# Patient Record
Sex: MALE | Race: WHITE | NOT HISPANIC OR LATINO | URBAN - NONMETROPOLITAN AREA
[De-identification: names, ages, dates, MRNs, and addresses within clinical notes are randomized per-mention and may not be internally consistent; named-entity substitution may affect disease eponyms.]

---

## 2023-02-28 PROBLEM — Q67.6 PECTUS EXCAVATUM: Status: ACTIVE | Noted: 2023-02-28

## 2023-02-28 PROBLEM — R10.11 RUQ PAIN: Status: ACTIVE | Noted: 2023-02-28

## 2023-02-28 PROBLEM — R10.13 ABDOMINAL PAIN, EPIGASTRIC: Status: ACTIVE | Noted: 2023-02-28

## 2023-02-28 PROBLEM — R29.91 MARFANOID HABITUS: Status: ACTIVE | Noted: 2023-02-28

## 2023-02-28 PROBLEM — H61.22 IMPACTED CERUMEN OF LEFT EAR: Status: ACTIVE | Noted: 2023-02-28

## 2023-02-28 PROBLEM — M54.6 THORACIC BACK PAIN: Status: ACTIVE | Noted: 2023-02-28

## 2023-02-28 PROBLEM — M79.18 MUSCULOSKELETAL PAIN: Status: ACTIVE | Noted: 2023-02-28

## 2023-02-28 PROBLEM — F41.9 ANXIETY: Status: ACTIVE | Noted: 2023-02-28

## 2023-02-28 PROBLEM — K29.00 ACUTE GASTRITIS: Status: ACTIVE | Noted: 2023-02-28

## 2023-02-28 PROBLEM — Z98.890 HISTORY OF NISSEN FUNDOPLICATION: Status: ACTIVE | Noted: 2023-02-28

## 2023-02-28 PROBLEM — R07.9 CHEST PAIN: Status: ACTIVE | Noted: 2023-02-28

## 2023-02-28 PROBLEM — F32.A DEPRESSION: Status: ACTIVE | Noted: 2023-02-28

## 2023-02-28 RX ORDER — PANTOPRAZOLE SODIUM 40 MG/1
40 TABLET, DELAYED RELEASE ORAL DAILY
COMMUNITY
Start: 2019-11-15 | End: 2023-03-30 | Stop reason: SDUPTHER

## 2023-02-28 RX ORDER — SUCRALFATE 1 G/1
1 TABLET ORAL 3 TIMES DAILY
COMMUNITY
Start: 2019-11-15 | End: 2023-03-30 | Stop reason: SDUPTHER

## 2023-03-30 ENCOUNTER — OFFICE VISIT (OUTPATIENT)
Dept: PRIMARY CARE | Facility: CLINIC | Age: 48
End: 2023-03-30
Payer: COMMERCIAL

## 2023-03-30 VITALS
WEIGHT: 126.8 LBS | SYSTOLIC BLOOD PRESSURE: 90 MMHG | OXYGEN SATURATION: 99 % | HEART RATE: 65 BPM | DIASTOLIC BLOOD PRESSURE: 60 MMHG | BODY MASS INDEX: 19.28 KG/M2

## 2023-03-30 DIAGNOSIS — Z12.11 COLON CANCER SCREENING: ICD-10-CM

## 2023-03-30 DIAGNOSIS — Q67.6 PECTUS EXCAVATUM: ICD-10-CM

## 2023-03-30 DIAGNOSIS — Z98.890 HISTORY OF NISSEN FUNDOPLICATION: ICD-10-CM

## 2023-03-30 DIAGNOSIS — R10.13 ABDOMINAL PAIN, EPIGASTRIC: ICD-10-CM

## 2023-03-30 DIAGNOSIS — Z00.00 ROUTINE GENERAL MEDICAL EXAMINATION AT A HEALTH CARE FACILITY: Primary | ICD-10-CM

## 2023-03-30 PROBLEM — R10.11 RUQ PAIN: Status: RESOLVED | Noted: 2023-02-28 | Resolved: 2023-03-30

## 2023-03-30 PROBLEM — R29.91 MARFANOID HABITUS: Status: RESOLVED | Noted: 2023-02-28 | Resolved: 2023-03-30

## 2023-03-30 PROBLEM — F41.9 ANXIETY: Status: RESOLVED | Noted: 2023-02-28 | Resolved: 2023-03-30

## 2023-03-30 PROBLEM — H61.22 IMPACTED CERUMEN OF LEFT EAR: Status: RESOLVED | Noted: 2023-02-28 | Resolved: 2023-03-30

## 2023-03-30 PROBLEM — M79.18 MUSCULOSKELETAL PAIN: Status: RESOLVED | Noted: 2023-02-28 | Resolved: 2023-03-30

## 2023-03-30 PROBLEM — M54.6 THORACIC BACK PAIN: Status: RESOLVED | Noted: 2023-02-28 | Resolved: 2023-03-30

## 2023-03-30 PROBLEM — K29.00 ACUTE GASTRITIS: Status: RESOLVED | Noted: 2023-02-28 | Resolved: 2023-03-30

## 2023-03-30 PROBLEM — F32.A DEPRESSION: Status: RESOLVED | Noted: 2023-02-28 | Resolved: 2023-03-30

## 2023-03-30 PROBLEM — R07.9 CHEST PAIN: Status: RESOLVED | Noted: 2023-02-28 | Resolved: 2023-03-30

## 2023-03-30 PROCEDURE — 1036F TOBACCO NON-USER: CPT | Performed by: FAMILY MEDICINE

## 2023-03-30 PROCEDURE — 99396 PREV VISIT EST AGE 40-64: CPT | Performed by: FAMILY MEDICINE

## 2023-03-30 RX ORDER — PANTOPRAZOLE SODIUM 40 MG/1
40 TABLET, DELAYED RELEASE ORAL DAILY
Qty: 90 TABLET | Refills: 3 | Status: SHIPPED | OUTPATIENT
Start: 2023-03-30 | End: 2024-05-30

## 2023-03-30 RX ORDER — SUCRALFATE 1 G/1
1 TABLET ORAL 3 TIMES DAILY
Qty: 270 TABLET | Refills: 3 | Status: SHIPPED | OUTPATIENT
Start: 2023-03-30

## 2023-03-30 ASSESSMENT — PATIENT HEALTH QUESTIONNAIRE - PHQ9
2. FEELING DOWN, DEPRESSED OR HOPELESS: NOT AT ALL
SUM OF ALL RESPONSES TO PHQ9 QUESTIONS 1 AND 2: 0
1. LITTLE INTEREST OR PLEASURE IN DOING THINGS: NOT AT ALL

## 2023-03-30 ASSESSMENT — ENCOUNTER SYMPTOMS
HEMATURIA: 0
NERVOUS/ANXIOUS: 0
TROUBLE SWALLOWING: 0
HEADACHES: 0
JOINT SWELLING: 0
FEVER: 0
SHORTNESS OF BREATH: 0
CONFUSION: 0
PALPITATIONS: 0
ARTHRALGIAS: 0
SEIZURES: 0
VOMITING: 0
UNEXPECTED WEIGHT CHANGE: 0
NAUSEA: 0
BELCHING: 0
ANOREXIA: 0
DYSURIA: 0
BLOOD IN STOOL: 0
CONSTIPATION: 0
FREQUENCY: 0
COLOR CHANGE: 0
APPETITE CHANGE: 0
HEMATOCHEZIA: 0
ABDOMINAL PAIN: 1
MYALGIAS: 0
WEIGHT LOSS: 0
DIFFICULTY URINATING: 0
DEPRESSION: 0
DIARRHEA: 0
FLATUS: 0

## 2023-03-30 NOTE — PROGRESS NOTES
Subjective   Patient ID: Tan Rivera is a 47 y.o. male who presents for Abdominal Pain (Sx x 4-5 years).  Abdominal Pain  This is a chronic problem. The current episode started more than 1 year ago. The onset quality is undetermined. The problem occurs daily. The problem has been waxing and waning. The pain is located in the periumbilical region. The pain is at a severity of 2/10. The quality of the pain is cramping. The abdominal pain radiates to the back. Pertinent negatives include no anorexia, arthralgias, belching, constipation, diarrhea, dysuria, fever, flatus, frequency, headaches, hematochezia, hematuria, melena, myalgias, nausea, vomiting or weight loss. The pain is aggravated by certain positions. The pain is relieved by Nothing.     Pleasant 47 year old  male presents for wellness check and medication renewal. Patient states that he has a dull abdominal pain that is worse when he lays on his right side or with bending over. Patient states that he has had this same pain for the last three years, it is not getting worse. Patient denies N/V, diarrhea, constipation, CP, SOB, numbness or tingling in extremities.    Review of Systems   Constitutional:  Negative for appetite change, fever, unexpected weight change and weight loss.   HENT:  Negative for congestion and trouble swallowing.    Eyes:  Negative for visual disturbance.   Respiratory:  Negative for shortness of breath.    Cardiovascular:  Negative for chest pain, palpitations and leg swelling.   Gastrointestinal:  Positive for abdominal pain. Negative for anorexia, blood in stool, constipation, diarrhea, flatus, hematochezia, melena, nausea and vomiting.   Genitourinary:  Negative for difficulty urinating, dysuria, frequency and hematuria.   Musculoskeletal:  Negative for arthralgias, gait problem, joint swelling and myalgias.   Skin:  Negative for color change.   Allergic/Immunologic: Negative for immunocompromised state.   Neurological:   Negative for seizures, syncope and headaches.   Psychiatric/Behavioral:  Negative for confusion and suicidal ideas. The patient is not nervous/anxious.        Objective   BP 90/60 (BP Location: Right arm, Patient Position: Sitting, BP Cuff Size: Adult)   Pulse 65   Wt 57.5 kg (126 lb 12.8 oz)   SpO2 99%   BMI 19.28 kg/m²     Physical Exam  Constitutional:       General: He is not in acute distress.     Appearance: Normal appearance. He is not ill-appearing.   HENT:      Head: Normocephalic and atraumatic.      Right Ear: Tympanic membrane normal.      Left Ear: Tympanic membrane normal.      Nose: Nose normal.      Mouth/Throat:      Mouth: Mucous membranes are moist.   Eyes:      Pupils: Pupils are equal, round, and reactive to light.   Cardiovascular:      Rate and Rhythm: Normal rate and regular rhythm.      Heart sounds: No murmur heard.     No friction rub. No gallop.   Pulmonary:      Effort: Pulmonary effort is normal.      Breath sounds: Normal breath sounds.   Abdominal:      General: Abdomen is flat. There is no distension.      Palpations: Abdomen is soft.      Tenderness: There is no abdominal tenderness. There is no guarding.      Hernia: No hernia is present.   Musculoskeletal:         General: Normal range of motion.   Skin:     General: Skin is warm and dry.   Neurological:      General: No focal deficit present.      Mental Status: He is alert. Mental status is at baseline.      Cranial Nerves: No cranial nerve deficit.      Motor: No weakness.      Gait: Gait normal.   Psychiatric:         Mood and Affect: Mood normal.         Behavior: Behavior normal.         Thought Content: Thought content normal.         Judgment: Judgment normal.         Assessment/Plan   Problem List Items Addressed This Visit          Nervous    Abdominal pain, epigastric    Relevant Medications    pantoprazole (ProtoNix) 40 mg EC tablet    sucralfate (Carafate) 1 gram tablet       Musculoskeletal    Pectus excavatum        Other    History of Nissen fundoplication    Relevant Medications    pantoprazole (ProtoNix) 40 mg EC tablet    sucralfate (Carafate) 1 gram tablet     Other Visit Diagnoses       Routine general medical examination at a health care facility    -  Primary    Colon cancer screening        Relevant Orders    Cologuard® colon cancer screening           Chronic abd pain: hx of Nissen fundoplication  -continue protonix, doesn't seem to need carafate  -more positional- offered to refer back to GI for updated EGD wants to wait    Pectus Excavatum:  -no symptoms    .Health Maintenance Reminder:  -Blood Work: next year  -PSA: next year  -Hep C: neat year  -Colonoscopy: cologuard  -Shingrix: 50y  -Prevnar 20: 65y  -Flu:  Yearly

## 2023-05-29 LAB — NONINV COLON CA DNA+OCC BLD SCRN STL QL: NEGATIVE

## 2023-08-09 ENCOUNTER — APPOINTMENT (OUTPATIENT)
Dept: PRIMARY CARE | Facility: CLINIC | Age: 48
End: 2023-08-09
Payer: COMMERCIAL

## 2023-08-09 RX ORDER — ASPIRIN 81 MG/1
81 TABLET ORAL DAILY
COMMUNITY

## 2024-05-30 DIAGNOSIS — R10.13 ABDOMINAL PAIN, EPIGASTRIC: ICD-10-CM

## 2024-05-30 DIAGNOSIS — Z98.890 HISTORY OF NISSEN FUNDOPLICATION: ICD-10-CM

## 2024-05-30 RX ORDER — PANTOPRAZOLE SODIUM 40 MG/1
40 TABLET, DELAYED RELEASE ORAL DAILY
Qty: 90 TABLET | Refills: 0 | Status: SHIPPED | OUTPATIENT
Start: 2024-05-30

## 2025-04-18 ENCOUNTER — APPOINTMENT (OUTPATIENT)
Dept: PRIMARY CARE | Facility: CLINIC | Age: 50
End: 2025-04-18

## 2025-04-18 VITALS
BODY MASS INDEX: 18.29 KG/M2 | OXYGEN SATURATION: 99 % | HEART RATE: 89 BPM | WEIGHT: 123.5 LBS | HEIGHT: 69 IN | DIASTOLIC BLOOD PRESSURE: 62 MMHG | SYSTOLIC BLOOD PRESSURE: 100 MMHG

## 2025-04-18 DIAGNOSIS — R10.13 ABDOMINAL PAIN, EPIGASTRIC: ICD-10-CM

## 2025-04-18 DIAGNOSIS — Z13.0 SCREENING FOR DEFICIENCY ANEMIA: ICD-10-CM

## 2025-04-18 DIAGNOSIS — L98.9 SKIN LESION: ICD-10-CM

## 2025-04-18 DIAGNOSIS — Z13.1 SCREENING FOR DIABETES MELLITUS: ICD-10-CM

## 2025-04-18 DIAGNOSIS — Z98.890 HISTORY OF NISSEN FUNDOPLICATION: ICD-10-CM

## 2025-04-18 DIAGNOSIS — Z12.5 SCREENING FOR MALIGNANT NEOPLASM OF PROSTATE: Primary | ICD-10-CM

## 2025-04-18 DIAGNOSIS — Z13.220 SCREENING FOR HYPERLIPIDEMIA: ICD-10-CM

## 2025-04-18 RX ORDER — PANTOPRAZOLE SODIUM 40 MG/1
40 TABLET, DELAYED RELEASE ORAL DAILY
Qty: 90 TABLET | Refills: 3 | Status: SHIPPED | OUTPATIENT
Start: 2025-04-18

## 2025-04-18 RX ORDER — SUCRALFATE 1 G/1
1 TABLET ORAL 3 TIMES DAILY
Qty: 270 TABLET | Refills: 3 | Status: SHIPPED | OUTPATIENT
Start: 2025-04-18

## 2025-04-18 ASSESSMENT — ENCOUNTER SYMPTOMS
DIARRHEA: 0
FLATUS: 0
ABDOMINAL PAIN: 1
ANOREXIA: 0
HEADACHES: 0
VOMITING: 0
HEMATURIA: 0
NAUSEA: 0
FEVER: 0
FREQUENCY: 0
HEMATOCHEZIA: 0
MYALGIAS: 0
ARTHRALGIAS: 0
DYSURIA: 0
WEIGHT LOSS: 0
CONSTIPATION: 0
BELCHING: 0

## 2025-04-18 NOTE — PROGRESS NOTES
"Subjective   Patient ID: Tan Rivera is a 49 y.o. male who presents for Annual Exam (Yearly physical ).  Abdominal Pain  This is a chronic problem. The current episode started more than 1 year ago. The onset quality is sudden. The problem occurs constantly. The most recent episode lasted 24 hours. The problem has been waxing and waning. The pain is located in the LUQ. The pain is at a severity of 2/10. The quality of the pain is cramping. The abdominal pain radiates to the back. Pertinent negatives include no anorexia, arthralgias, belching, constipation, diarrhea, dysuria, fever, flatus, frequency, headaches, hematochezia, hematuria, melena, myalgias, nausea, vomiting or weight loss. The pain is aggravated by certain positions. The pain is relieved by Nothing.       Chronic Issues:  - GERD- on protonix 40 mg daily and wants carafate back     New concerns:  - new lesion on L arm, scaly and sometimes itchy, has come up in only the last month or so     Health Maintenance Reminder:  -Blood Work: today  -PSA: today  -Hep C: today  -Colonoscopy: cologuard neg 2023, due 2026  -Shingrix: 50y refused  -Prevnar 20: 65y  -Flu:  refused    Mood: no depression or anxiety   Sleep: fine, sometimes cant sleep through night , wakes up 4x, can fall right back to sleep.   Appetite/diet: appetite and diet is fine   Exercise: \"not as much as he should\"  Drugs, alcohol, tobacco: denies all     Current Medications[1]   Surgical History[2]   Medical History[3]  Social History[4]   Family History[5]   Review of Systems   Constitutional:  Negative for fever and weight loss.   Gastrointestinal:  Positive for abdominal pain. Negative for anorexia, constipation, diarrhea, flatus, hematochezia, melena, nausea and vomiting.   Genitourinary:  Negative for dysuria, frequency and hematuria.   Musculoskeletal:  Negative for arthralgias and myalgias.   Neurological:  Negative for headaches.     10 point ROS negative except as otherwise noted in the " "HPI.      Objective   /62   Pulse 89   Ht 1.753 m (5' 9\")   Wt 56 kg (123 lb 8 oz)   SpO2 99%   BMI 18.24 kg/m²    Physical Exam  Constitutional:       Appearance: Normal appearance.   HENT:      Head: Normocephalic and atraumatic.   Eyes:      Extraocular Movements: Extraocular movements intact.      Pupils: Pupils are equal, round, and reactive to light.   Cardiovascular:      Rate and Rhythm: Normal rate and regular rhythm.      Pulses: Normal pulses.      Heart sounds: Normal heart sounds.   Pulmonary:      Effort: Pulmonary effort is normal.      Breath sounds: Normal breath sounds.   Musculoskeletal:         General: Normal range of motion.      Right lower leg: No edema.      Left lower leg: No edema.   Skin:     General: Skin is warm and dry.      Findings: Lesion present. No rash.      Comments: 5 mm flesh colored raised and scaled lesion on L forearm   Neurological:      General: No focal deficit present.      Mental Status: He is alert and oriented to person, place, and time.      Comments: Antalgic gait   Psychiatric:         Mood and Affect: Mood normal.         Behavior: Behavior normal.               Shaving Epidermal/Dermal    Date/Time: 4/18/2025 9:04 AM    Performed by: Nette Cao PA-C  Authorized by: Nette Cao PA-C    Number of Lesions: 1  Lesion 1:     Body area: upper extremity    Upper extremity location: L lower arm    Initial size (mm): 5    Final defect size (mm): 7    Malignancy: malignancy unknown      Destruction method: curettage      Comments:  5 mm round raised scaly flesh colored lesion , slightly darker than surrounding tissue  Risks, benefits, alternatives to biopsy discussed. Pt decided to proceed w biopsy today   Cleaned with povidine iodine and alcohol swabs   Injected with 2 cc lido w epi   Shave bx completed   electrocautery after  Bandaid applied  Tolerated well, no immediate complications  Lesion sent for analysis       Assessment/Plan   Problem List " Items Addressed This Visit       Abdominal pain, epigastric  -GERD grossly stable. Protonix and carafate help but do not resolve  - continued to recommend referral back to GI for repeat EGD but pt feels things are steady and states he will do this if it gets worse.    Relevant Medications    sucralfate (Carafate) 1 gram tablet    pantoprazole (ProtoNix) 40 mg EC tablet    History of Nissen fundoplication    Relevant Medications    sucralfate (Carafate) 1 gram tablet    pantoprazole (ProtoNix) 40 mg EC tablet     Other Visit Diagnoses         Screening for malignant neoplasm of prostate    -  Primary    Relevant Orders    Prostate Specific Antigen      Screening for hyperlipidemia        Relevant Orders    Lipid Panel      Screening for diabetes mellitus        Relevant Orders    Comprehensive Metabolic Panel      Screening for deficiency anemia        Relevant Orders    CBC and Auto Differential      Skin lesion      - new suspicious lesion on L forearm  - shave bx as per procedure note     Relevant Orders    Surgical Pathology Exam     Labs today   Will call w lab and bx results        Discussed at visit any disease processes that were of concern as well as the risks, benefits and instructions on any new medication provided. Patient (and/or caretaker of patient if present) stated all questions were answered, and they voiced understanding of instructions.     Nette Cao PA-C          [1]   Current Outpatient Medications:     pantoprazole (ProtoNix) 40 mg EC tablet, Take 1 tablet (40 mg) by mouth once daily., Disp: 90 tablet, Rfl: 3    sucralfate (Carafate) 1 gram tablet, Take 1 tablet (1 g) by mouth 3 times a day., Disp: 270 tablet, Rfl: 3  [2]   Past Surgical History:  Procedure Laterality Date    CERVICAL FUSION  04/17/2015    Cervical Vertebral Fusion    GASTRIC FUNDOPLICATION  07/24/2017    Esophagogastric Fundoplasty Nissen Fundoplication    OTHER SURGICAL HISTORY  07/24/2017    Repair Of Paraesophageal  Hiatus Hernia   [3]   Past Medical History:  Diagnosis Date    Anxiety disorder, unspecified     Anxiety    Chronic vascular disorders of intestine 04/03/2017    Superior mesenteric artery syndrome    Diaphragmatic hernia without obstruction or gangrene 01/22/2018    Hiatal hernia with gastroesophageal reflux    Personal history of other diseases of the digestive system 01/22/2018    History of gastroesophageal reflux (GERD)   [4]   Social History  Tobacco Use    Smoking status: Never    Smokeless tobacco: Never   Substance Use Topics    Alcohol use: Not Currently    Drug use: Never   [5]   Family History  Problem Relation Name Age of Onset    Other (cancer of abdominal organ) Mother

## 2025-04-19 LAB
ALBUMIN SERPL-MCNC: 4.5 G/DL (ref 3.6–5.1)
ALP SERPL-CCNC: 50 U/L (ref 35–144)
ALT SERPL-CCNC: 12 U/L (ref 9–46)
ANION GAP SERPL CALCULATED.4IONS-SCNC: 5 MMOL/L (CALC) (ref 7–17)
AST SERPL-CCNC: 17 U/L (ref 10–35)
BASOPHILS # BLD AUTO: 40 CELLS/UL (ref 0–200)
BASOPHILS NFR BLD AUTO: 1 %
BILIRUB SERPL-MCNC: 0.6 MG/DL (ref 0.2–1.2)
BUN SERPL-MCNC: 17 MG/DL (ref 7–25)
CALCIUM SERPL-MCNC: 9.2 MG/DL (ref 8.6–10.3)
CHLORIDE SERPL-SCNC: 103 MMOL/L (ref 98–110)
CHOLEST SERPL-MCNC: 168 MG/DL
CHOLEST/HDLC SERPL: 2.9 (CALC)
CO2 SERPL-SCNC: 32 MMOL/L (ref 20–32)
CREAT SERPL-MCNC: 0.93 MG/DL (ref 0.7–1.3)
EGFRCR SERPLBLD CKD-EPI 2021: 99 ML/MIN/1.73M2
EOSINOPHIL # BLD AUTO: 60 CELLS/UL (ref 15–500)
EOSINOPHIL NFR BLD AUTO: 1.5 %
ERYTHROCYTE [DISTWIDTH] IN BLOOD BY AUTOMATED COUNT: 12.3 % (ref 11–15)
GLUCOSE SERPL-MCNC: 80 MG/DL (ref 65–99)
HCT VFR BLD AUTO: 43.4 % (ref 38.5–50)
HDLC SERPL-MCNC: 58 MG/DL
HGB BLD-MCNC: 14.4 G/DL (ref 13.2–17.1)
LDLC SERPL CALC-MCNC: 90 MG/DL (CALC)
LYMPHOCYTES # BLD AUTO: 1340 CELLS/UL (ref 850–3900)
LYMPHOCYTES NFR BLD AUTO: 33.5 %
MCH RBC QN AUTO: 31.4 PG (ref 27–33)
MCHC RBC AUTO-ENTMCNC: 33.2 G/DL (ref 32–36)
MCV RBC AUTO: 94.8 FL (ref 80–100)
MONOCYTES # BLD AUTO: 420 CELLS/UL (ref 200–950)
MONOCYTES NFR BLD AUTO: 10.5 %
NEUTROPHILS # BLD AUTO: 2140 CELLS/UL (ref 1500–7800)
NEUTROPHILS NFR BLD AUTO: 53.5 %
NONHDLC SERPL-MCNC: 110 MG/DL (CALC)
PLATELET # BLD AUTO: 229 THOUSAND/UL (ref 140–400)
PMV BLD REES-ECKER: 9.1 FL (ref 7.5–12.5)
POTASSIUM SERPL-SCNC: 4.2 MMOL/L (ref 3.5–5.3)
PROT SERPL-MCNC: 6.7 G/DL (ref 6.1–8.1)
PSA SERPL-MCNC: 0.56 NG/ML
RBC # BLD AUTO: 4.58 MILLION/UL (ref 4.2–5.8)
SODIUM SERPL-SCNC: 140 MMOL/L (ref 135–146)
TRIGL SERPL-MCNC: 106 MG/DL
WBC # BLD AUTO: 4 THOUSAND/UL (ref 3.8–10.8)

## 2025-04-29 LAB
LABORATORY COMMENT REPORT: NORMAL
PATH REPORT.FINAL DX SPEC: NORMAL
PATH REPORT.GROSS SPEC: NORMAL
PATH REPORT.RELEVANT HX SPEC: NORMAL
PATH REPORT.TOTAL CANCER: NORMAL

## 2025-07-02 ENCOUNTER — OFFICE VISIT (OUTPATIENT)
Dept: SURGERY | Facility: CLINIC | Age: 50
End: 2025-07-02
Payer: COMMERCIAL

## 2025-07-02 DIAGNOSIS — R10.13 ABDOMINAL PAIN, EPIGASTRIC: ICD-10-CM

## 2025-07-02 DIAGNOSIS — K44.9 HIATAL HERNIA: Primary | ICD-10-CM

## 2025-07-02 PROCEDURE — 99203 OFFICE O/P NEW LOW 30 MIN: CPT | Performed by: SURGERY

## 2025-07-02 NOTE — PROGRESS NOTES
General Surgery History and Physical    Referring Provider:  Nette Cao PA-C    Chief Complaint:  Hernia pain    History of Present Illness:  This is a 49 y.o. male who presents with epigastric pressure and discomfort.  He reports that he had issues with this for many years, but in 2018 had a laparoscopic hiatal hernia repair with Nissen fundoplication.  He reports that those symptoms drastically reduced to almost nothing after surgery.  However, he has remained on his antacids.  He reports that he has done well until about the last 3 weeks.  He reports that he is now having near constant pressure in his epigastrium subxiphoid region.  He denies any heartburn or reflux.  He denies any dysphagia to solids or liquids.  However, he reports that the pressures usually worse if he bends over.  He denies any alleviating factors.    Past Medical History:  Anxiety  Gastroesphageal reflux disease    Past Surgical History:  Cervical fusion  Laparoscopic Nissen Fundoplication    Medications:  Pantoprazole  Carafate    Allergies:  No known drug allergies    Family History:  Unknown cancer    Social History:  Single.  Works as an .  Denies tobacco, alcohol, and illicits.    Review of Systems:  A complete 12 point review of systems was performed and is negative except as noted in the history of present illness.      Vital Signs:  There were no vitals filed for this visit.       Physical Exam:  General: No acute distress. Sitting up in bed.   Neuro: Alert and oriented ×3. Follows commands.  Head: Atraumatic  Eyes: Pupils equal reactive to light. Extraocular motions intact.  Ears: Hears normal speaking voice.  Mouth, Nose, Throat: Mucous membranes moist.  Normal dentition.  Neck: Supple. No appreciable masses.  Chest: No crepitus.  No appreciable scars.  Heart: Regular rate and rhythm.  Lung: Clear to auscultation bilaterally.  Vascular: No carotid bruits.  Palpable radial pulses bilaterally.  Abdomen: Soft.  Nondistended. Nontender.  Well-healed laparoscopic scars.  Musculoskeletal: Moves all extremities.  Normal range of motion.  Lymphatic: No palpable lymph nodes.  Skin: No rashes or lesions.  Psychological: Normal affect      Laboratory Values:  CBC:   Lab Results   Component Value Date    WBC 4.0 04/18/2025    RBC 4.58 04/18/2025    HGB 14.4 04/18/2025    HCT 43.4 04/18/2025     04/18/2025       RFP:   Lab Results   Component Value Date     04/18/2025    K 4.2 04/18/2025     04/18/2025    CO2 32 04/18/2025    BUN 17 04/18/2025    CREATININE 0.93 04/18/2025    CALCIUM 9.2 04/18/2025        LFTs:   Lab Results   Component Value Date    PROT 6.7 04/18/2025    ALBUMIN 4.5 04/18/2025    BILITOT 0.6 04/18/2025    ALKPHOS 50 04/18/2025    AST 17 04/18/2025    ALT 12 04/18/2025            Imaging:   None      Assessment:  This is a 49 y.o. male who presents with epigastric discomfort for about 3 weeks.  He has previously undergone a laparoscopic hiatal hernia repair with Nissen fundoplication.  He specifically denies any issues with heartburn or reflux as well as dysphagia to either solids or liquids.  However, he is concerned for a recurrent hiatal hernia.  We discussed that this is a reasonable concern, but at this time it is uncertain if this is actually going on.  We discussed starting with an esophagram, and following up with Dr. Rowley.      Plan:  -- Esophagram  -- Referral to Dr. Halley London MD  General Surgery  Office: [phone]  Fax:     [phone]  10:39 AM   07/02/25

## 2025-07-14 ENCOUNTER — HOSPITAL ENCOUNTER (OUTPATIENT)
Dept: RADIOLOGY | Facility: HOSPITAL | Age: 50
Discharge: HOME | End: 2025-07-14
Payer: COMMERCIAL

## 2025-07-14 DIAGNOSIS — R10.13 ABDOMINAL PAIN, EPIGASTRIC: ICD-10-CM

## 2025-07-14 DIAGNOSIS — K44.9 HIATAL HERNIA: ICD-10-CM

## 2025-07-14 PROCEDURE — A9698 NON-RAD CONTRAST MATERIALNOC: HCPCS | Performed by: SURGERY

## 2025-07-14 PROCEDURE — 2500000001 HC RX 250 WO HCPCS SELF ADMINISTERED DRUGS (ALT 637 FOR MEDICARE OP): Performed by: SURGERY

## 2025-07-14 PROCEDURE — 74221 X-RAY XM ESOPHAGUS 2CNTRST: CPT

## 2025-07-14 PROCEDURE — 2500000005 HC RX 250 GENERAL PHARMACY W/O HCPCS: Performed by: SURGERY

## 2025-07-14 PROCEDURE — 74221 X-RAY XM ESOPHAGUS 2CNTRST: CPT | Performed by: RADIOLOGY

## 2025-07-14 RX ADMIN — BARIUM SULFATE 100 ML: 980 POWDER, FOR SUSPENSION ORAL at 10:25

## 2025-07-14 RX ADMIN — ANTACID/ANTIFLATULENT 1 PACKET: 380; 550; 10; 10 GRANULE, EFFERVESCENT ORAL at 10:25

## 2025-07-14 RX ADMIN — BARIUM SULFATE 70 ML: 0.6 SUSPENSION ORAL at 10:25

## 2025-07-24 ENCOUNTER — TELEPHONE (OUTPATIENT)
Dept: SURGERY | Facility: CLINIC | Age: 50
End: 2025-07-24
Payer: COMMERCIAL

## 2025-07-24 NOTE — PROGRESS NOTES
"GENERAL SURGERY REFLUX SURGERY NEW PATIENT CONSULTATION  HISTORY AND PHYSICAL    CLINIC DATE:  7/28/25    NAME: Tan Rivera  49 y.o.  MRN: [id number]    WEIGHT / BMI TODAY:    Wt Readings from Last 1 Encounters:   04/18/25 56 kg (123 lb 8 oz)      BMI Readings from Last 1 Encounters:   04/18/25 18.24 kg/m²       SURGEON:  Dr. Zita Rowley    PRESENTING TODAY for General Surgery initial consult visit.  This LPN spoke with a 49 y.o. male on 7/24/25.  Accepted earlier NPV date when offered.          CURRENT SYMPTOMS:     Abdominal Pain: Yes          Abdominal Bloating: Yes         Burping: No          Chest Pain: Yes      Chronic Cough: No         Constipation: No    Diarrhea: No    Dysphagia: Yes   \"a little bit\"    Hunger: No    Food Intolerances: Yes \"no spicy\"    Food Sticking: No    Gassy: No    Hiccups: No          Hoarseness: Yes       Hx Gastric Ulcers: No         Nausea: No    Symptoms Affect Ability to Excercise: Yes     \"if I do like heavy lifting then it aggravates me\"    Throat Clearing: No         Vomiting: No    Symptom Onset:  \"it's been years, worse a little while ago, now not as bad for about 1.5 months but still there\"      EATING HABITS HISTORY:      Carbonated Beverages: No          Caffeinated Beverages: Yes    Fluid intake: \"feels hydrated\": Yes       GERD - Health Related Quality of Life Questionnaire (GERD- HRQL)  On PPI  Pantoprazole 40 mg Daily & Sucralfate TID (Self stopped not sure should be taking)    How bad is the heartburn? 2 = Symptoms noticeable and bothersome but not every day  Heartburn when lying down? 2 = Symptoms noticeable and bothersome but not every day  Heartburn when standing up? 2 = Symptoms noticeable and bothersome but not every day  Heartburn after meals? 2 = Symptoms noticeable and bothersome but not every day  Does heartburn change your diet? 3 = Symptoms bothersome every day  \"avoids spicy & alcohol\"  Does heartburn wake you from sleep? 0 = No symptoms    Do you have " difficulty swallowing? 0 = No symptoms  Do you have pain with swallowing? 0 = No symptoms  If you take medication, does this affect your daily life? 1 = Symptoms noticeable but not bothersome    How bad is the regurgitation? 0 = No symptoms  Regurgitation when lying down? 0 = No symptoms  Regurgitation when standing up? 0 = No symptoms  Regurgitation after meals? 0 = No symptoms  Does regurgitation change your diet? 0 = No symptoms  Does regurgitation wake you from sleep? 0 = No symptoms  How satisfied are you with your present condition? Dissatisfied    Total score (calculated by summing the individual scores of questions 1-15): 12   Greatest possible score 75 (worst symptoms).   Lowest possible score 0 (no symptoms).    Heartburn score (calculated by summing the individual scores of questions 1-6): 11   Worst heartburn symptoms: 30.   No heartburn symptoms: 0.   Score less than or equal to 12 with each individual question not exceeding 2 indicate heartburn elimination.    Regurgitation score (calculated by summing the individual scores of questions 10-15): 0   Worst regurgitation symptoms: 30.   No regurgitation symptoms: 0.   Score less than or equal to 12 with each individual question not exceeding 2 indicate regurgitation elimination.       OTHER PROVIDER TEST RESULTS:    FL esophagus double contrast study w chest   Status: Final result     PACS Images     Show images for FL esophagus double contrast study w chest   Signed by    Signed Time Phone Pager   Doug Taylor MD 7/14/2025 10:37     FINDINGS:      The oral phase of swallowing was well coordinated and strong.  There  was no penetration of the airway, nasopharyngeal reflux, or  aspiration. .  Initial wave of peristalsis did not completely clear the esophagus of  thick liquid barium with each swallow. There was mild delay in full  esophageal emptying with the thick barium liquid. There was more  normal appearing esophageal peristalsis with  water and thin liquid  barium. There was no esophageal stricture or ulcer or mass. There was  a persistent defect in the gastric fundus consistent with previous  Nissen fundoplication. There was very mild smooth tapering of the  distal esophagus through the Nissen fundoplication, consistent with  an intact and functioning wrap. There was no hiatal hernia or  gastroesophageal reflux during this exam. .  Other than the defect in the gastric fundus from the Nissen  fundoplication, the stomach distended normally and show grossly  normal fold pattern throughout. There was no gross intraluminal mass  or ulceration. There was no gastric outlet obstruction.      The duodenum and proximal jejunum showed normal course, caliber, and  fold pattern.      IMPRESSION:  Intact and functioning Nissen fundoplication.      Mild esophageal dysmotility evident with the thick liquid barium.  Please see above for details.      Remainder of the exam was negative.      MACRO:  None      Signed by: Doug Taylor 7/14/2025 10:37 AM      OTHER PROVIDER IMPRESSION NOTES:       Assessment:  This is a 49 y.o. male who presents with epigastric discomfort for about 3 weeks.  He has previously undergone a laparoscopic hiatal hernia repair with Nissen fundoplication.  He specifically denies any issues with heartburn or reflux as well as dysphagia to either solids or liquids.  However, he is concerned for a recurrent hiatal hernia.  We discussed that this is a reasonable concern, but at this time it is uncertain if this is actually going on.  We discussed starting with an esophagram, and following up with Dr. Rowley.        Plan:  -- Esophagram  -- Referral to Dr. Halley London MD  General Surgery  Office: [phone]  Fax:     [phone]  10:39 AM   07/02/25                Electronically signed by Harpal London MD at 7/2/2025 11:14 AM    PROVIDER LAST IMPRESSION NOTES:  Chief Complaint NOV: referred by Dr. Reina for SMA  History of Present Illness 42 y/o male presenting with 2 years h/o intermittent chest pain and back pain and 1 year of epigastric pain. he describes chest pain to be like pressure sensation. No relation with food intake. Dexilant seems to make it better. He reports no difficulty with swallowing food. Denies any nausea or vomiting. Back pain seems to be getting better. Last summer he went to ED with severe chest pain and epigastric pain. He underwent EKG which was normal per patient. They prescribed PPI which helped a little with pain. Currently epigastric pain is made worse by exertion and bending down. No relation with food intake. No improvement with dexilant. Patiernt reports no weight loss with these complain and reports that he has always been underweight. No h/o similar pain in past before 2 years ago. Reports normal bowel movements except occasional diarrhea. He noted he started losing weight after last summer. that is when the chest pain got worse and epigastric pain. Laying down makes it worse. Rolling around helps. So now does not lay down at all. When laying down for ct, had severe pain in epigastrium and almost had to get up in middle of procedure.. All symptoms behind the breast. He ran out of dexilant a week ago. Notes the chest pains are coming back. worse the last 2 days. Zantac does not help much. No trouble eating. notes appetite decreased. notes early satiety. most he has ever weighed if 5 lbs above current weigght.     Results/Data CT Abdomen and Pelvis with Contrast 10Mar2017 01:07PM.   IMPRESSION: MORPHOLOGY OF THE DUODENUM, SMA AND ITS PRIMARY BRANCHES, LEFT RENAL VEIN, INFERIOR MESENTERIC VEIN AND ITS TRIBUTARIES DESCRIBED ABOVE IS SUGGESTIVE OF POSSIBILITY OF BOTH SMA SYNDROME (SYMPTOMATIC COMPRESSION OF THE MID DUODENUM BY AV SMA OR IN THIS CASE ITS 1ST ORDER BRANCHES) AND NUTCRACKER SYNDROME (LEFT RENAL VEIN COMPRESSION BY THE SMA WITH CONSEQUENT STASIS AND ENGORGEMENT IN THE IMV AND ITS  TRIBUTARIES), ALL DUE TO HYPER ACUTE ANGULATION OF THE SMA AND ITS 1ST ORDER BRANCHES RELATIVE TO THE AORTA BEHIND IT. NO OTHER CONTRIBUTORY ABNORMALITY. Transcribed by: [id number], User Electronically signed by: MARTHA 03/13/17 09:06    Provider Impressions 40 y/o male with 3 y/o h/o intermittent chest pain and back pain and 1 year h/o epigastric pain. Imaging shows SMA compressing 4th portion of duodenum. Patient is underweight and likely contributing to SMA syndrome. His symptoms do not go with the findings but all may be due to obstruction due to the RUSTY. Patient notes position changes bring on symptoms. We will do upper gi and egd for further eval. medically treat the spasms and then determine further managetment. Patient Discussion/Summary Will also do upper endoscopy and upper GI May need to do a manometry Start high calorie and high protein diet. Drink 3-4 protein shakes daily in addition to meals. eat 5-6 times/day. Check nutritional labs May need surgical bypass if continued symptoms Cont PPI for symptoms relief. We will renew the dexilant. We will start the amytriptilline for the spasms. Attending Note Attendee Role: Fellow Attestation: I saw and evaluated the patient. I personally obtained the key and critical portions of the history and physical exam or was physically present for key and critical portions performed by the attendee. I reviewed the attendee's documentation and discussed the patient with the attendee. I agree with the attendee's medical decision making as documented on the attendee's note.   Signatures Electronically signed by : Zita Rowley MD; Apr 3 2017 3:28PM EST (Author)  __  Chief Complaint S/P 1 1/2 weeks- HHR. History of Present Illness 40 yo male 2 weeks post Nissen and HH repair. Says he still feels some chest pressure. Notes it was worse with laying down and bending over. Eating soft foods. notes some early satiety. moving bowels    Surgical History ? History of Cervical  Vertebral Fusion ? History of Esophagogastric Fundoplasty Nissen Fundoplication ? History of Repair Of Paraesophageal Hiatus Hernia     Provider Impressions 42 yo male 2 weeks post nissen overall doing well. Most symptoms resolved. Just still some chest pressure. But he can bend over and lay flat which is better than before surgery. We discussed that the chest pressure is likely healing and hope it will resolve with time. Needed some percocet post op. No anti reflux meds since surgery. Reassured that he is healing. He notes his symptoms have gone from a 10 at worst to a 2 now (they were 5/6 on meds). Patient Discussion/Summary Eat more often with 6 meals. Chew and eat slowly. Increase the consitency of the food. Red meats and doughy breads will be the hardest to swallow. Don't try these until everything else going well. You can now do ground meats, cooked vegetables. Make sure you chew well. End of Encounter Meds DiazePAM 2 MG Oral Tablet; TAKE 1/2 TABLET TWICE DAILY; Therapy: [id number] to (Evaluate:[id number]); Last Rx:[id number]; Status: ACTIVE - Retrospective By Protocol Authorization Ordered Signatures     Electronically signed by : Zita Rowley MD; Jul 24 2017 11:17AM EST (Author)  __  Provider Impressions 43 yo male well known to me who had a Nissen and HH repair a year ago. He is doing well with this. But he notes some ongoing pressure in chest. He notes it is worse when does not move bowels. We will monitor for now and encouraged to eat more vegetables. No relief with antacids. Not worsened by eating. All other symptoms from before surgery have resolved. Patient Discussion/Summary Plan to have a fruit or vegetable at least 3 times/day Eat your bran muffin daily. Eat 3 meals/day. Keep the bowels regular   Electronically signed by : Zita Rowley MD; Apr 23 2018 9:05AM EST (Author)      CURRENT MEDICATIONS:    Current Medications[1]    PAST MEDICAL HISTORY:  Problem List[2]    PAST SURGICAL HISTORY:  Surgical  History[3]    FAMILY HISTORY:  Family History[4]    SOCIAL HISTORY:  Social History     Socioeconomic History    Marital status: Single     Spouse name: Not on file    Number of children: Not on file    Years of education: Not on file    Highest education level: Not on file   Occupational History    Not on file   Tobacco Use    Smoking status: Never    Smokeless tobacco: Never   Substance and Sexual Activity    Alcohol use: Not Currently    Drug use: Never    Sexual activity: Not on file   Other Topics Concern    Not on file   Social History Narrative    Not on file     Social Drivers of Health     Financial Resource Strain: Not on file   Food Insecurity: Not on file   Transportation Needs: Not on file   Physical Activity: Not on file   Stress: Not on file   Social Connections: Not on file   Intimate Partner Violence: Not on file   Housing Stability: Not on file       ALLERGIES:  RX Allergies[5]    REVIEW OF SYSTEMS:  GENERAL: Negative for malaise, significant weight loss and fever  NECK: Negative for lumps, goiter, pain and significant neck swelling  RESPIRATORY: Negative for cough, wheezing or shortness of breath.  CARDIOVASCULAR: Negative for chest pain, leg swelling or palpitations.  GI: Negative for abdominal discomfort, blood in stools or black stools or change in bowel habits  : No history of dysuria, frequency or incontinence  MUSCULOSKELETAL: Negative for joint pain or swelling, back pain or muscle pain.  SKIN: Negative for lesions, rash, and itching.  PSYCH: Negative for sleep disturbance, mood disorder and recent psychosocial stressors.  ENDOCRINE: Negative for cold or heat intolerance, polyuria, polydipsia and goiter.    PHYSICAL EXAM:  There were no vitals taken for this visit.  General appearance:   Skin: warm, no erythema or rashes  Lungs: clear to percussion and auscultation  Heart: regular rhythm and S1, S2 normal  Abdomen: soft, non-tender, no masses, no organomegaly  Extremities: Normal exam of  the extremities. No swelling or pain.  Neck: supple with no nodes    IMPRESSION:  Tan Rivera is a 49 y.o. male with ***  This patient is having ongoing symptoms despite PPI use and optimal medical management. We will work up with esophagram, UGI, EGD, pH study and manometry.  Then we can discuss the treatment options.  We briefly discussed surgical options with hiatal hernia repair and possible plication versus magnetic sphincter augmentation.  We also discussed endoscopic approaches with TIF and Stretta.  The patient will follow up after the studies are complete.    PLAN:  ***  UGI/Esophagram with views of the stomach-Schedule this in fluoroscopy in radiology at your convenience  EGD with pH probe-For this you will need a .  You will need to stop your omeprazole 7 days prior and switch to pepcid..  Stop the pepcie 4 days prior and then use tums until the day before.  When you have the probe in place, be sure to eat and do things that bring on your symptoms to have a provocative study.   Esophageal Manometry-This study is for us to learn how your esophagus works.  It will take about 15 minutes and you will have 15 swallows of salt water.  It is a little uncomfortable but not painful and gives us a lot of information  Schedule at follow up after the studies are complete.     The risks of the procedures including bleeding, infection, injury to neighboring organ, prolonged hospitalization, need for further procedure and death have been explained to the patient and Tan Rivera has expressed understanding and acceptance of them. We will discuss the final procedure after the results are reviewed.     Dr. Zita Rowley M.D., MPH  Director of Bariatric & Minimally Invasive Surgery  [address] [address]  T:  [phone]  F:  [phone]    Krystle Johns RN, BSN - Bariatric Clinical Nurse Coordinator - [facility]  T:  [phone]    F:   [phone]        [1]   Current Outpatient Medications   Medication Sig Dispense Refill    pantoprazole (ProtoNix) 40 mg EC tablet Take 1 tablet (40 mg) by mouth once daily. 90 tablet 3    sucralfate (Carafate) 1 gram tablet Take 1 tablet (1 g) by mouth 3 times a day. 270 tablet 3     No current facility-administered medications for this visit.   [2]   Patient Active Problem List  Diagnosis    Abdominal pain, epigastric    History of Nissen fundoplication    Pectus excavatum   [3]   Past Surgical History:  Procedure Laterality Date    CERVICAL FUSION  04/17/2015    Cervical Vertebral Fusion    GASTRIC FUNDOPLICATION  07/24/2017    Esophagogastric Fundoplasty Nissen Fundoplication    OTHER SURGICAL HISTORY  07/24/2017    Repair Of Paraesophageal Hiatus Hernia   [4]   Family History  Problem Relation Name Age of Onset    Other (cancer of abdominal organ) Mother     [5] No Known Allergies     by mouth once daily. 90 tablet 3    sucralfate (Carafate) 1 gram tablet Take 1 tablet (1 g) by mouth 3 times a day. 270 tablet 3     No current facility-administered medications for this visit.   [2]   Patient Active Problem List  Diagnosis    Abdominal pain, epigastric    History of Nissen fundoplication    Pectus excavatum   [3]   Past Surgical History:  Procedure Laterality Date    CERVICAL FUSION  04/17/2015    Cervical Vertebral Fusion    GASTRIC FUNDOPLICATION  07/24/2017    Esophagogastric Fundoplasty Nissen Fundoplication    OTHER SURGICAL HISTORY  07/24/2017    Repair Of Paraesophageal Hiatus Hernia   [4]   Family History  Problem Relation Name Age of Onset    Other (cancer of abdominal organ) Mother     [5] No Known Allergies

## 2025-07-24 NOTE — TELEPHONE ENCOUNTER
Thank you for speaking with me earlier today & confirming your scheduled visit with Dr. Rowley on 7/28/25 12:30 PM at [facility] [address] ([address]) [place] [address] (inside [facility], main floor in the Specialty Clinic).   Parking is available at a cost of around $5.00 cash at the Main Entrance.  Clinic exam rooms run warm to cool, please dress in layers for your comfort.  We look forward to seeing you, Lidia Cantu LPN Clinic Nurse.

## 2025-07-28 ENCOUNTER — APPOINTMENT (OUTPATIENT)
Dept: SURGERY | Facility: CLINIC | Age: 50
End: 2025-07-28
Payer: COMMERCIAL

## 2025-07-28 VITALS
OXYGEN SATURATION: 95 % | SYSTOLIC BLOOD PRESSURE: 96 MMHG | HEART RATE: 85 BPM | DIASTOLIC BLOOD PRESSURE: 63 MMHG | WEIGHT: 118 LBS | BODY MASS INDEX: 17.43 KG/M2

## 2025-07-28 DIAGNOSIS — Z98.890 HISTORY OF NISSEN FUNDOPLICATION: ICD-10-CM

## 2025-07-28 DIAGNOSIS — R10.11 RIGHT UPPER QUADRANT ABDOMINAL PAIN: ICD-10-CM

## 2025-07-28 DIAGNOSIS — R10.13 ABDOMINAL PAIN, EPIGASTRIC: Primary | ICD-10-CM

## 2025-07-28 PROCEDURE — 99205 OFFICE O/P NEW HI 60 MIN: CPT | Performed by: SURGERY

## 2025-08-01 NOTE — PATIENT INSTRUCTIONS
PLAN:  We will do an upper endoscopy  Get ultrasound of the gb  Follow up after the studies are done      Dr. Zita Rowley M.D., MPH  Director of Bariatric & Minimally Invasive Surgery  [place] [address]  T:  [phone]  F:  [phone]    Krystle Johns RN, BSN - Bariatric Clinical Nurse Coordinator - [facility]  T:  [phone]    F:  [phone]

## 2025-08-04 ENCOUNTER — APPOINTMENT (OUTPATIENT)
Dept: SURGERY | Facility: CLINIC | Age: 50
End: 2025-08-04
Payer: COMMERCIAL

## 2025-08-06 ENCOUNTER — TELEPHONE (OUTPATIENT)
Dept: SURGERY | Facility: CLINIC | Age: 50
End: 2025-08-06
Payer: COMMERCIAL

## 2025-08-06 NOTE — TELEPHONE ENCOUNTER
I attempted to call the patient to get the procedure that Dr. Rowley had ordered. I called and LVM stating the above leaving the office number to call back.

## 2025-08-22 ENCOUNTER — TELEPHONE (OUTPATIENT)
Dept: SURGERY | Facility: CLINIC | Age: 50
End: 2025-08-22
Payer: COMMERCIAL

## 2025-08-23 ENCOUNTER — HOSPITAL ENCOUNTER (OUTPATIENT)
Dept: RADIOLOGY | Facility: HOSPITAL | Age: 50
Discharge: HOME | End: 2025-08-23
Payer: COMMERCIAL

## 2025-08-23 DIAGNOSIS — R10.11 RIGHT UPPER QUADRANT ABDOMINAL PAIN: ICD-10-CM

## 2025-08-23 PROCEDURE — 76705 ECHO EXAM OF ABDOMEN: CPT

## 2025-08-23 PROCEDURE — 76705 ECHO EXAM OF ABDOMEN: CPT | Performed by: RADIOLOGY

## 2025-08-24 LAB
ALBUMIN SERPL-MCNC: 4.8 G/DL (ref 3.6–5.1)
ALP SERPL-CCNC: 53 U/L (ref 35–144)
ALT SERPL-CCNC: 11 U/L (ref 9–46)
ANION GAP SERPL CALCULATED.4IONS-SCNC: 6 MMOL/L (CALC) (ref 7–17)
AST SERPL-CCNC: 15 U/L (ref 10–35)
BILIRUB SERPL-MCNC: 0.7 MG/DL (ref 0.2–1.2)
BUN SERPL-MCNC: 12 MG/DL (ref 7–25)
CALCIUM SERPL-MCNC: 9.4 MG/DL (ref 8.6–10.3)
CHLORIDE SERPL-SCNC: 102 MMOL/L (ref 98–110)
CO2 SERPL-SCNC: 33 MMOL/L (ref 20–32)
CREAT SERPL-MCNC: 0.95 MG/DL (ref 0.7–1.3)
EGFRCR SERPLBLD CKD-EPI 2021: 97 ML/MIN/1.73M2
GLUCOSE SERPL-MCNC: 95 MG/DL (ref 65–99)
POTASSIUM SERPL-SCNC: 4.2 MMOL/L (ref 3.5–5.3)
PROT SERPL-MCNC: 7.1 G/DL (ref 6.1–8.1)
SODIUM SERPL-SCNC: 141 MMOL/L (ref 135–146)

## 2025-08-28 ENCOUNTER — ANESTHESIA EVENT (OUTPATIENT)
Dept: OPERATING ROOM | Facility: HOSPITAL | Age: 50
End: 2025-08-28
Payer: COMMERCIAL

## 2025-08-29 ENCOUNTER — HOSPITAL ENCOUNTER (OUTPATIENT)
Dept: OPERATING ROOM | Facility: HOSPITAL | Age: 50
Setting detail: OUTPATIENT SURGERY
Discharge: HOME | End: 2025-08-29
Payer: COMMERCIAL

## 2025-08-29 ENCOUNTER — ANESTHESIA (OUTPATIENT)
Dept: OPERATING ROOM | Facility: HOSPITAL | Age: 50
End: 2025-08-29
Payer: COMMERCIAL

## 2025-08-29 VITALS
OXYGEN SATURATION: 98 % | TEMPERATURE: 97.5 F | DIASTOLIC BLOOD PRESSURE: 73 MMHG | HEIGHT: 69 IN | SYSTOLIC BLOOD PRESSURE: 119 MMHG | HEART RATE: 70 BPM | RESPIRATION RATE: 14 BRPM | WEIGHT: 120.15 LBS | BODY MASS INDEX: 17.8 KG/M2

## 2025-08-29 DIAGNOSIS — Z98.890 HISTORY OF NISSEN FUNDOPLICATION: ICD-10-CM

## 2025-08-29 DIAGNOSIS — R10.13 ABDOMINAL PAIN, EPIGASTRIC: ICD-10-CM

## 2025-08-29 PROBLEM — K44.9 HIATAL HERNIA: Status: ACTIVE | Noted: 2025-08-29

## 2025-08-29 PROCEDURE — 2500000004 HC RX 250 GENERAL PHARMACY W/ HCPCS (ALT 636 FOR OP/ED): Performed by: ANESTHESIOLOGY

## 2025-08-29 PROCEDURE — 3600000007 HC OR TIME - EACH INCREMENTAL 1 MINUTE - PROCEDURE LEVEL TWO: Performed by: ANESTHESIOLOGY

## 2025-08-29 PROCEDURE — 3700000001 HC GENERAL ANESTHESIA TIME - INITIAL BASE CHARGE: Performed by: ANESTHESIOLOGY

## 2025-08-29 PROCEDURE — 43239 EGD BIOPSY SINGLE/MULTIPLE: CPT | Performed by: SURGERY

## 2025-08-29 PROCEDURE — 7100000009 HC PHASE TWO TIME - INITIAL BASE CHARGE: Performed by: ANESTHESIOLOGY

## 2025-08-29 PROCEDURE — 99215 OFFICE O/P EST HI 40 MIN: CPT | Performed by: STUDENT IN AN ORGANIZED HEALTH CARE EDUCATION/TRAINING PROGRAM

## 2025-08-29 PROCEDURE — 3600000002 HC OR TIME - INITIAL BASE CHARGE - PROCEDURE LEVEL TWO: Performed by: ANESTHESIOLOGY

## 2025-08-29 PROCEDURE — 3700000002 HC GENERAL ANESTHESIA TIME - EACH INCREMENTAL 1 MINUTE: Performed by: ANESTHESIOLOGY

## 2025-08-29 PROCEDURE — 2500000004 HC RX 250 GENERAL PHARMACY W/ HCPCS (ALT 636 FOR OP/ED): Performed by: NURSE ANESTHETIST, CERTIFIED REGISTERED

## 2025-08-29 PROCEDURE — 7100000010 HC PHASE TWO TIME - EACH INCREMENTAL 1 MINUTE: Performed by: ANESTHESIOLOGY

## 2025-08-29 RX ORDER — LIDOCAINE HCL/PF 100 MG/5ML
SYRINGE (ML) INTRAVENOUS AS NEEDED
Status: DISCONTINUED | OUTPATIENT
Start: 2025-08-29 | End: 2025-08-29

## 2025-08-29 RX ORDER — OXYCODONE HYDROCHLORIDE 5 MG/1
5 TABLET ORAL EVERY 4 HOURS PRN
Status: DISCONTINUED | OUTPATIENT
Start: 2025-08-29 | End: 2025-08-30 | Stop reason: HOSPADM

## 2025-08-29 RX ORDER — ALBUTEROL SULFATE 0.83 MG/ML
2.5 SOLUTION RESPIRATORY (INHALATION) ONCE AS NEEDED
Status: DISCONTINUED | OUTPATIENT
Start: 2025-08-29 | End: 2025-08-30 | Stop reason: HOSPADM

## 2025-08-29 RX ORDER — MEPERIDINE HYDROCHLORIDE 25 MG/ML
12.5 INJECTION INTRAMUSCULAR; INTRAVENOUS; SUBCUTANEOUS EVERY 10 MIN PRN
Status: DISCONTINUED | OUTPATIENT
Start: 2025-08-29 | End: 2025-08-30 | Stop reason: HOSPADM

## 2025-08-29 RX ORDER — SODIUM CHLORIDE, SODIUM LACTATE, POTASSIUM CHLORIDE, CALCIUM CHLORIDE 600; 310; 30; 20 MG/100ML; MG/100ML; MG/100ML; MG/100ML
100 INJECTION, SOLUTION INTRAVENOUS CONTINUOUS
Status: DISCONTINUED | OUTPATIENT
Start: 2025-08-29 | End: 2025-08-30 | Stop reason: HOSPADM

## 2025-08-29 RX ORDER — DIPHENHYDRAMINE HYDROCHLORIDE 50 MG/ML
12.5 INJECTION, SOLUTION INTRAMUSCULAR; INTRAVENOUS ONCE AS NEEDED
Status: DISCONTINUED | OUTPATIENT
Start: 2025-08-29 | End: 2025-08-30 | Stop reason: HOSPADM

## 2025-08-29 RX ORDER — MIDAZOLAM HYDROCHLORIDE 1 MG/ML
INJECTION, SOLUTION INTRAMUSCULAR; INTRAVENOUS AS NEEDED
Status: DISCONTINUED | OUTPATIENT
Start: 2025-08-29 | End: 2025-08-29

## 2025-08-29 RX ORDER — ONDANSETRON HYDROCHLORIDE 2 MG/ML
4 INJECTION, SOLUTION INTRAVENOUS ONCE AS NEEDED
Status: DISCONTINUED | OUTPATIENT
Start: 2025-08-29 | End: 2025-08-30 | Stop reason: HOSPADM

## 2025-08-29 RX ORDER — SODIUM CHLORIDE, SODIUM LACTATE, POTASSIUM CHLORIDE, CALCIUM CHLORIDE 600; 310; 30; 20 MG/100ML; MG/100ML; MG/100ML; MG/100ML
20 INJECTION, SOLUTION INTRAVENOUS CONTINUOUS
Status: DISCONTINUED | OUTPATIENT
Start: 2025-08-29 | End: 2025-08-30 | Stop reason: HOSPADM

## 2025-08-29 RX ORDER — PROPOFOL 10 MG/ML
INJECTION, EMULSION INTRAVENOUS AS NEEDED
Status: DISCONTINUED | OUTPATIENT
Start: 2025-08-29 | End: 2025-08-29

## 2025-08-29 RX ORDER — DROPERIDOL 2.5 MG/ML
0.62 INJECTION, SOLUTION INTRAMUSCULAR; INTRAVENOUS ONCE AS NEEDED
Status: DISCONTINUED | OUTPATIENT
Start: 2025-08-29 | End: 2025-08-30 | Stop reason: HOSPADM

## 2025-08-29 RX ADMIN — PROPOFOL 20 MG: 10 INJECTION, EMULSION INTRAVENOUS at 10:05

## 2025-08-29 RX ADMIN — PROPOFOL 50 MG: 10 INJECTION, EMULSION INTRAVENOUS at 09:59

## 2025-08-29 RX ADMIN — PROPOFOL 25 MG: 10 INJECTION, EMULSION INTRAVENOUS at 10:02

## 2025-08-29 RX ADMIN — SODIUM CHLORIDE, SODIUM LACTATE, POTASSIUM CHLORIDE, AND CALCIUM CHLORIDE 20 ML/HR: .6; .31; .03; .02 INJECTION, SOLUTION INTRAVENOUS at 09:30

## 2025-08-29 RX ADMIN — LIDOCAINE HYDROCHLORIDE 40 MG: 20 INJECTION INTRAVENOUS at 09:59

## 2025-08-29 RX ADMIN — MIDAZOLAM 2 MG: 1 INJECTION INTRAMUSCULAR; INTRAVENOUS at 09:51

## 2025-08-29 RX ADMIN — PROPOFOL 160 MCG/KG/MIN: 10 INJECTION, EMULSION INTRAVENOUS at 10:00

## 2025-08-29 SDOH — HEALTH STABILITY: MENTAL HEALTH: CURRENT SMOKER: 0

## 2025-08-29 ASSESSMENT — PAIN DESCRIPTION - DESCRIPTORS: DESCRIPTORS: SORE;PRESSURE

## 2025-08-29 ASSESSMENT — PAIN SCALES - GENERAL
PAINLEVEL_OUTOF10: 2
PAINLEVEL_OUTOF10: 0 - NO PAIN

## 2025-08-29 ASSESSMENT — PAIN - FUNCTIONAL ASSESSMENT
PAIN_FUNCTIONAL_ASSESSMENT: 0-10
PAIN_FUNCTIONAL_ASSESSMENT: 0-10

## 2025-09-22 ENCOUNTER — APPOINTMENT (OUTPATIENT)
Dept: SURGERY | Facility: CLINIC | Age: 50
End: 2025-09-22
Payer: COMMERCIAL